# Patient Record
Sex: MALE | Race: WHITE | NOT HISPANIC OR LATINO | Employment: STUDENT | URBAN - METROPOLITAN AREA
[De-identification: names, ages, dates, MRNs, and addresses within clinical notes are randomized per-mention and may not be internally consistent; named-entity substitution may affect disease eponyms.]

---
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
## 2020-01-08 ENCOUNTER — HOSPITAL ENCOUNTER (OUTPATIENT)
Dept: LAB | Facility: MEDICAL CENTER | Age: 20
End: 2020-01-08
Attending: PEDIATRICS
Payer: COMMERCIAL

## 2020-01-08 LAB
INR PPP: 2.65 (ref 0.87–1.13)
PROTHROMBIN TIME: 29 SEC (ref 12–14.6)

## 2020-01-08 PROCEDURE — 85610 PROTHROMBIN TIME: CPT

## 2020-01-08 PROCEDURE — 36415 COLL VENOUS BLD VENIPUNCTURE: CPT

## 2020-01-27 ENCOUNTER — HOSPITAL ENCOUNTER (OUTPATIENT)
Dept: LAB | Facility: MEDICAL CENTER | Age: 20
End: 2020-01-27
Attending: PEDIATRICS
Payer: COMMERCIAL

## 2020-01-27 LAB
INR PPP: 3.29 (ref 0.87–1.13)
PROTHROMBIN TIME: 34.5 SEC (ref 12–14.6)

## 2020-01-27 PROCEDURE — 36415 COLL VENOUS BLD VENIPUNCTURE: CPT

## 2020-01-27 PROCEDURE — 85610 PROTHROMBIN TIME: CPT

## 2020-02-03 ENCOUNTER — HOSPITAL ENCOUNTER (OUTPATIENT)
Dept: LAB | Facility: MEDICAL CENTER | Age: 20
End: 2020-02-03
Attending: PEDIATRICS
Payer: COMMERCIAL

## 2020-02-03 LAB
INR PPP: 2.32 (ref 0.87–1.13)
PROTHROMBIN TIME: 26.1 SEC (ref 12–14.6)

## 2020-02-03 PROCEDURE — 85610 PROTHROMBIN TIME: CPT

## 2020-02-03 PROCEDURE — 36415 COLL VENOUS BLD VENIPUNCTURE: CPT

## 2020-02-05 ENCOUNTER — HOSPITAL ENCOUNTER (OUTPATIENT)
Dept: LAB | Facility: MEDICAL CENTER | Age: 20
End: 2020-02-05
Attending: PEDIATRICS
Payer: COMMERCIAL

## 2020-02-05 LAB
INR PPP: 2.7 (ref 0.87–1.13)
PROTHROMBIN TIME: 29.4 SEC (ref 12–14.6)

## 2020-02-05 PROCEDURE — 85610 PROTHROMBIN TIME: CPT

## 2020-02-05 PROCEDURE — 36415 COLL VENOUS BLD VENIPUNCTURE: CPT

## 2020-02-07 ENCOUNTER — HOSPITAL ENCOUNTER (OUTPATIENT)
Dept: LAB | Facility: MEDICAL CENTER | Age: 20
End: 2020-02-07
Attending: PEDIATRICS
Payer: COMMERCIAL

## 2020-02-07 LAB
INR PPP: 3.38 (ref 0.87–1.13)
PROTHROMBIN TIME: 35.2 SEC (ref 12–14.6)

## 2020-02-07 PROCEDURE — 36415 COLL VENOUS BLD VENIPUNCTURE: CPT

## 2020-02-07 PROCEDURE — 85610 PROTHROMBIN TIME: CPT

## 2020-02-08 ENCOUNTER — HOSPITAL ENCOUNTER (OUTPATIENT)
Dept: LAB | Facility: MEDICAL CENTER | Age: 20
End: 2020-02-08
Attending: PEDIATRICS
Payer: COMMERCIAL

## 2020-02-08 LAB
INR PPP: 3 (ref 0.87–1.13)
PROTHROMBIN TIME: 32.2 SEC (ref 12–14.6)

## 2020-02-08 PROCEDURE — 85610 PROTHROMBIN TIME: CPT

## 2020-02-08 PROCEDURE — 36415 COLL VENOUS BLD VENIPUNCTURE: CPT

## 2020-02-11 ENCOUNTER — HOSPITAL ENCOUNTER (OUTPATIENT)
Dept: LAB | Facility: MEDICAL CENTER | Age: 20
End: 2020-02-11
Attending: PEDIATRICS
Payer: COMMERCIAL

## 2020-02-11 LAB
INR PPP: 2.68 (ref 0.87–1.13)
PROTHROMBIN TIME: 29.3 SEC (ref 12–14.6)

## 2020-02-11 PROCEDURE — 36415 COLL VENOUS BLD VENIPUNCTURE: CPT

## 2020-02-11 PROCEDURE — 85610 PROTHROMBIN TIME: CPT

## 2020-02-14 ENCOUNTER — HOSPITAL ENCOUNTER (OUTPATIENT)
Dept: LAB | Facility: MEDICAL CENTER | Age: 20
End: 2020-02-14
Attending: PEDIATRICS
Payer: COMMERCIAL

## 2020-02-14 LAB
INR PPP: 3.33 (ref 0.87–1.13)
PROTHROMBIN TIME: 35.1 SEC (ref 12–14.6)

## 2020-02-14 PROCEDURE — 36415 COLL VENOUS BLD VENIPUNCTURE: CPT

## 2020-02-14 PROCEDURE — 85610 PROTHROMBIN TIME: CPT

## 2020-02-19 ENCOUNTER — HOSPITAL ENCOUNTER (OUTPATIENT)
Dept: LAB | Facility: MEDICAL CENTER | Age: 20
End: 2020-02-19
Attending: PEDIATRICS
Payer: COMMERCIAL

## 2020-02-19 LAB
INR PPP: 3.28 (ref 0.87–1.13)
PROTHROMBIN TIME: 34.4 SEC (ref 12–14.6)

## 2020-02-19 PROCEDURE — 36415 COLL VENOUS BLD VENIPUNCTURE: CPT

## 2020-02-19 PROCEDURE — 85610 PROTHROMBIN TIME: CPT

## 2020-03-05 ENCOUNTER — HOSPITAL ENCOUNTER (OUTPATIENT)
Dept: LAB | Facility: MEDICAL CENTER | Age: 20
End: 2020-03-05
Attending: PEDIATRICS
Payer: COMMERCIAL

## 2020-03-05 LAB
INR PPP: 3.78 (ref 0.87–1.13)
PROTHROMBIN TIME: 38.5 SEC (ref 12–14.6)

## 2020-03-05 PROCEDURE — 85610 PROTHROMBIN TIME: CPT

## 2020-03-05 PROCEDURE — 36415 COLL VENOUS BLD VENIPUNCTURE: CPT

## 2020-03-09 ENCOUNTER — HOSPITAL ENCOUNTER (OUTPATIENT)
Dept: LAB | Facility: MEDICAL CENTER | Age: 20
End: 2020-03-09
Attending: PEDIATRICS
Payer: COMMERCIAL

## 2020-03-09 LAB
INR PPP: 4.17 (ref 0.87–1.13)
PROTHROMBIN TIME: 41.7 SEC (ref 12–14.6)

## 2020-03-09 PROCEDURE — 85610 PROTHROMBIN TIME: CPT

## 2020-03-09 PROCEDURE — 36415 COLL VENOUS BLD VENIPUNCTURE: CPT

## 2020-03-12 ENCOUNTER — HOSPITAL ENCOUNTER (OUTPATIENT)
Dept: LAB | Facility: MEDICAL CENTER | Age: 20
End: 2020-03-12
Attending: PEDIATRICS
Payer: COMMERCIAL

## 2020-03-12 LAB
INR PPP: 2.81 (ref 0.87–1.13)
PROTHROMBIN TIME: 30.6 SEC (ref 12–14.6)

## 2020-03-12 PROCEDURE — 36415 COLL VENOUS BLD VENIPUNCTURE: CPT

## 2020-03-12 PROCEDURE — 85610 PROTHROMBIN TIME: CPT

## 2020-03-17 ENCOUNTER — HOSPITAL ENCOUNTER (OUTPATIENT)
Dept: LAB | Facility: MEDICAL CENTER | Age: 20
End: 2020-03-17
Attending: PEDIATRICS
Payer: COMMERCIAL

## 2020-03-17 LAB
INR PPP: 3.97 (ref 0.87–1.13)
PROTHROMBIN TIME: 40.1 SEC (ref 12–14.6)

## 2020-03-17 PROCEDURE — 36415 COLL VENOUS BLD VENIPUNCTURE: CPT

## 2020-03-17 PROCEDURE — 85610 PROTHROMBIN TIME: CPT

## 2020-03-23 ENCOUNTER — HOSPITAL ENCOUNTER (OUTPATIENT)
Dept: LAB | Facility: MEDICAL CENTER | Age: 20
End: 2020-03-23
Attending: PEDIATRICS
Payer: COMMERCIAL

## 2020-03-23 LAB
INR PPP: 2.78 (ref 0.87–1.13)
PROTHROMBIN TIME: 30.1 SEC (ref 12–14.6)

## 2020-03-23 PROCEDURE — 85610 PROTHROMBIN TIME: CPT

## 2020-03-23 PROCEDURE — 36415 COLL VENOUS BLD VENIPUNCTURE: CPT

## 2020-04-07 ENCOUNTER — HOSPITAL ENCOUNTER (OUTPATIENT)
Dept: LAB | Facility: MEDICAL CENTER | Age: 20
End: 2020-04-07
Attending: PEDIATRICS
Payer: COMMERCIAL

## 2020-04-07 LAB
INR PPP: 2.74 (ref 0.87–1.13)
PROTHROMBIN TIME: 29.8 SEC (ref 12–14.6)

## 2020-04-07 PROCEDURE — 85610 PROTHROMBIN TIME: CPT

## 2020-04-07 PROCEDURE — 36415 COLL VENOUS BLD VENIPUNCTURE: CPT

## 2020-04-27 ENCOUNTER — HOSPITAL ENCOUNTER (OUTPATIENT)
Dept: LAB | Facility: MEDICAL CENTER | Age: 20
End: 2020-04-27
Attending: PEDIATRICS
Payer: COMMERCIAL

## 2020-04-27 LAB
INR PPP: 2.05 (ref 0.87–1.13)
PROTHROMBIN TIME: 23.8 SEC (ref 12–14.6)

## 2020-04-27 PROCEDURE — 36415 COLL VENOUS BLD VENIPUNCTURE: CPT

## 2020-04-27 PROCEDURE — 85610 PROTHROMBIN TIME: CPT

## 2020-09-14 ENCOUNTER — TELEMEDICINE (OUTPATIENT)
Dept: MEDICAL GROUP | Facility: LAB | Age: 20
End: 2020-09-14
Payer: COMMERCIAL

## 2020-09-14 VITALS — WEIGHT: 103 LBS | BODY MASS INDEX: 20.22 KG/M2 | HEIGHT: 60 IN

## 2020-09-14 DIAGNOSIS — L73.2 HIDRADENITIS SUPPURATIVA: ICD-10-CM

## 2020-09-14 DIAGNOSIS — Q90.9 DOWN'S SYNDROME: ICD-10-CM

## 2020-09-14 DIAGNOSIS — Z95.2 HISTORY OF PROSTHETIC MITRAL VALVE: ICD-10-CM

## 2020-09-14 DIAGNOSIS — Z76.89 ENCOUNTER TO ESTABLISH CARE WITH NEW DOCTOR: ICD-10-CM

## 2020-09-14 DIAGNOSIS — I27.20 PULMONARY HYPERTENSION (HCC): ICD-10-CM

## 2020-09-14 PROBLEM — Q21.20 ATRIOVENTRICULAR SEPTAL DEFECT: Status: ACTIVE | Noted: 2020-09-14

## 2020-09-14 PROCEDURE — 99214 OFFICE O/P EST MOD 30 MIN: CPT | Performed by: FAMILY MEDICINE

## 2020-09-14 RX ORDER — WARFARIN SODIUM 5 MG/1
TABLET ORAL
COMMUNITY
Start: 2020-07-12

## 2020-09-14 RX ORDER — DOXYCYCLINE HYCLATE 100 MG
TABLET ORAL
COMMUNITY
Start: 2020-08-21

## 2020-09-14 RX ORDER — CLINDAMYCIN PHOSPHATE AND BENZOYL PEROXIDE 10; 50 MG/G; MG/G
GEL TOPICAL
COMMUNITY
Start: 2020-07-29

## 2020-09-14 RX ORDER — WARFARIN SODIUM 1 MG
TABLET ORAL
COMMUNITY
Start: 2020-07-08

## 2020-09-14 ASSESSMENT — FIBROSIS 4 INDEX: FIB4 SCORE: 0.52

## 2020-09-14 NOTE — PATIENT INSTRUCTIONS
Hidradenitis Suppurativa  Hidradenitis suppurativa is a long-term (chronic) skin disease. It is similar to a severe form of acne, but it affects areas of the body where acne would be unusual, especially areas of the body where skin rubs against skin and becomes moist. These include:  · Underarms.  · Groin.  · Genital area.  · Buttocks.  · Upper thighs.  · Breasts.  Hidradenitis suppurativa may start out as small lumps or pimples caused by blocked sweat glands or hair follicles. Pimples may develop into deep sores that break open (rupture) and drain pus. Over time, affected areas of skin may thicken and become scarred. This condition is rare and does not spread from person to person (non-contagious).  What are the causes?  The exact cause of this condition is not known. It may be related to:  · Male and female hormones.  · An overactive disease-fighting system (immune system). The immune system may over-react to blocked hair follicles or sweat glands and cause swelling and pus-filled sores.  What increases the risk?  You are more likely to develop this condition if you:  · Are female.  · Are 11-55 years old.  · Have a family history of hidradenitis suppurativa.  · Have a personal history of acne.  · Are overweight.  · Smoke.  · Take the medicine lithium.  What are the signs or symptoms?  The first symptoms are usually painful bumps in the skin, similar to pimples. The condition may get worse over time (progress), or it may only cause mild symptoms. If the disease progresses, symptoms may include:  · Skin bumps getting bigger and growing deeper into the skin.  · Bumps rupturing and draining pus.  · Itchy, infected skin.  · Skin getting thicker and scarred.  · Tunnels under the skin (fistulas) where pus drains from a bump.  · Pain during daily activities, such as pain during walking if your groin area is affected.  · Emotional problems, such as stress or depression. This condition may affect your appearance and your  ability or willingness to wear certain clothes or do certain activities.  How is this diagnosed?  This condition is diagnosed by a health care provider who specializes in skin diseases (dermatologist). You may be diagnosed based on:  · Your symptoms and medical history.  · A physical exam.  · Testing a pus sample for infection.  · Blood tests.  How is this treated?  Your treatment will depend on how severe your symptoms are. The same treatment will not work for everybody with this condition. You may need to try several treatments to find what works best for you. Treatment may include:  · Cleaning and bandaging (dressing) your wounds as needed.  · Lifestyle changes, such as new skin care routines.  · Taking medicines, such as:  ? Antibiotics.  ? Acne medicines.  ? Medicines to reduce the activity of the immune system.  ? A diabetes medicine (metformin).  ? Birth control pills, for women.  ? Steroids to reduce swelling and pain.  · Working with a mental health care provider, if you experience emotional distress due to this condition.  If you have severe symptoms that do not get better with medicine, you may need surgery. Surgery may involve:  · Using a laser to clear the skin and remove hair follicles.  · Opening and draining deep sores.  · Removing the areas of skin that are diseased and scarred.  Follow these instructions at home:  Medicines    · Take over-the-counter and prescription medicines only as told by your health care provider.  · If you were prescribed an antibiotic medicine, take it as told by your health care provider. Do not stop taking the antibiotic even if your condition improves.  Skin care  · If you have open wounds, cover them with a clean dressing as told by your health care provider. Keep wounds clean by washing them gently with soap and water when you bathe.  · Do not shave the areas where you get hidradenitis suppurativa.  · Do not wear deodorant.  · Wear loose-fitting clothes.  · Try to avoid  getting overheated or sweaty. If you get sweaty or wet, change into clean, dry clothes as soon as you can.  · To help relieve pain and itchiness, cover sore areas with a warm, clean washcloth (warm compress) for 5-10 minutes as often as needed.  · If told by your health care provider, take a bleach bath twice a week:  ? Fill your bathtub retirement with water.  ? Pour in ½ cup of unscented household bleach.  ? Soak in the tub for 5-10 minutes.  ? Only soak from the neck down. Avoid water on your face and hair.  ? Shower to rinse off the bleach from your skin.  General instructions  · Learn as much as you can about your disease so that you have an active role in your treatment. Work closely with your health care provider to find treatments that work for you.  · If you are overweight, work with your health care provider to lose weight as recommended.  · Do not use any products that contain nicotine or tobacco, such as cigarettes and e-cigarettes. If you need help quitting, ask your health care provider.  · If you struggle with living with this condition, talk with your health care provider or work with a mental health care provider as recommended.  · Keep all follow-up visits as told by your health care provider. This is important.  Where to find more information  · Hidradenitis Suppurativa Foundation, Inc.: https://www.hs-foundation.org/  Contact a health care provider if you have:  · A flare-up of hidradenitis suppurativa.  · A fever or chills.  · Trouble controlling your symptoms at home.  · Trouble doing your daily activities because of your symptoms.  · Trouble dealing with emotional problems related to your condition.  Summary  · Hidradenitis suppurativa is a long-term (chronic) skin disease. It is similar to a severe form of acne, but it affects areas of the body where acne would be unusual.  · The first symptoms are usually painful bumps in the skin, similar to pimples. The condition may get worse over time  (progress), or it may only cause mild symptoms.  · If you have open wounds, cover them with a clean dressing as told by your health care provider. Keep wounds clean by washing them gently with soap and water when you bathe.  · Besides skin care, treatment may include medicines, laser treatment, and surgery.  This information is not intended to replace advice given to you by your health care provider. Make sure you discuss any questions you have with your health care provider.  Document Released: 08/01/2005 Document Revised: 12/26/2018 Document Reviewed: 12/26/2018  Elsevier Patient Education © 2020 [employer]

## 2020-09-14 NOTE — PROGRESS NOTES
Virtual Visit: Established Patient   This visit was conducted via Zoom using secure and encrypted videoconferencing technology. The patient was in a private location in the state of [place].    The patient's identity was confirmed and verbal consent was obtained for this virtual visit.  There is guardian and was also at the visit.    Subjective:   CC:   Chief Complaint   Patient presents with   • Establish Care       Mir Madrigal is a 20 y.o. male presenting for evaluation and management of:    Patient is here to establish care.  He was a previous patient of Dr. Smith.  He has a history of Down syndrome and mitral valve stenosis.  He has had 2 surgeries to repair the mitral valve and has a prosthetic valve that was placed in 2014.  He is therefore on chronic anticoagulation.  This is monitored by the cardiologist.  He has been doing well overall without any complaints except he has had hidradenitis.  He seen the dermatologist and is on doxycycline as well as a topical cream.  They would like to consider Humira but do not want to do it during COVID.  This area is in his groin and his mother does warm soaks when it flares.  .  Past Medical History:   Diagnosis Date   • Anticoagulant long-term use 10/18/2016    Patient on Coumadin for St Alexey's mitral valve replacement   • Asthma    • Down's syndrome    '  Past Surgical History:   Procedure Laterality Date   • DENTAL RESTORATION  4/18/08    Performed by SUDHAKAR KOLB at SURGERY SAME DAY [facility]   • MITRAL VALVE REPLACE      2014 - but he has 2   • OTHER CARDIAC SURGERY      open heart x 3     Social History     Socioeconomic History   • Marital status: Single     Spouse name: Not on file   • Number of children: Not on file   • Years of education: Not on file   • Highest education level: Not on file   Occupational History   • Not on file   Social Needs   • Financial resource strain: Not on file   • Food insecurity     Worry: Not on file      Inability: Not on file   • Transportation needs     Medical: Not on file     Non-medical: Not on file   Tobacco Use   • Smoking status: Never Smoker   • Smokeless tobacco: Never Used   Substance and Sexual Activity   • Alcohol use: No   • Drug use: No   • Sexual activity: Not on file   Lifestyle   • Physical activity     Days per week: Not on file     Minutes per session: Not on file   • Stress: Not on file   Relationships   • Social connections     Talks on phone: Not on file     Gets together: Not on file     Attends Mandaeism service: Not on file     Active member of club or organization: Not on file     Attends meetings of clubs or organizations: Not on file     Relationship status: Not on file   • Intimate partner violence     Fear of current or ex partner: Not on file     Emotionally abused: Not on file     Physically abused: Not on file     Forced sexual activity: Not on file   Other Topics Concern   • Behavioral problems Not Asked   • Interpersonal relationships Not Asked   • Sad or not enjoying activities Not Asked   • Suicidal thoughts Not Asked   • Poor school performance Not Asked   • Reading difficulties Not Asked   • Speech difficulties Not Asked   • Writing difficulties Not Asked   • Inadequate sleep Not Asked   • Excessive TV viewing Not Asked   • Excessive video game use Not Asked   • Inadequate exercise Not Asked   • Sports related Not Asked   • Poor diet Not Asked   • Family concerns for drug/alcohol abuse Not Asked   • Poor oral hygiene Not Asked   • Bike safety Not Asked   • Family concerns vehicle safety Not Asked   Social History Narrative    ** Merged History Encounter **            ROS   Denies any recent fevers or chills. No nausea or vomiting. No chest pains or shortness of breath.     No Known Allergies    Current medicines (including changes today)  Current Outpatient Medications   Medication Sig Dispense Refill   • Clindamycin-Benzoyl Per, Refr, 1.2-5 % Gel      • doxycycline  "(VIBRAMYCIN) 100 MG Tab      • MUPIROCIN EX by Apply externally route. 1 %     • warfarin (COUMADIN) 5 MG Tab Take 5mg EOD alternating with 5.5mg EOD     • Pediatric Multivit-Minerals-C (CHILDRENS GUMMIES) Chew Tab Take 2 Tabs by mouth every day.     • Calcium-Phosphorus-Vitamin D (CALCIUM/D3 ADULT GUMMIES PO) Take 2 Tabs by mouth every day.     • COUMADIN 1 MG Tab      • COUMADIN 5 MG Tab        No current facility-administered medications for this visit.        Patient Active Problem List    Diagnosis Date Noted   • Down's syndrome 10/18/2016     Priority: Medium   • Atrioventricular septal defect 09/14/2020   • History of prosthetic mitral valve 09/14/2020   • Hidradenitis suppurativa 09/14/2020   • History of asthma 04/23/2019   • Asymptomatic varicose veins of right lower extremity 04/23/2019   • Ventral hernia without obstruction or gangrene 04/23/2019   • Pulmonary hypertension (HCC) 09/14/2014       Family History   Problem Relation Age of Onset   • Heart Attack Maternal Uncle    • Heart Disease Maternal Grandfather    • Cancer Paternal Grandmother    • Hyperlipidemia Paternal Grandfather        He  has a past medical history of Anticoagulant long-term use (10/18/2016), Asthma, and Down's syndrome.  He  has a past surgical history that includes dental restoration (4/18/08); other cardiac surgery; and mitral valve replace.       Objective:   Ht 1.473 m (4' 10\")   Wt 46.7 kg (103 lb)   BMI 21.53 kg/m²     Physical Exam:  Constitutional: Alert, no distress, well-groomed.  Skin: No rashes in visible areas.  Eye: Round. Conjunctiva clear, lids normal. No icterus.   ENMT: Lips pink without lesions, good dentition, moist mucous membranes. Phonation normal.  Neck: No masses, no thyromegaly. Moves freely without pain.  Respiratory: Unlabored respiratory effort, no cough or audible wheeze  Psych: Alert and oriented x3, normal affect and mood.       Assessment and Plan:   The following treatment plan was discussed: "     1. Encounter to establish care with new doctor  History reviewed.    2. Down's syndrome  This is a new problem to me.  Currently stable    3. History of prosthetic mitral valve  This is a new problem to me.  Currently stable    4. Pulmonary hypertension (HCC)  This is a new problem to me.  Currently stable.  Seeing cardiology tomorrow    5. Hidradenitis suppurativa  Education materials given.  Continue doxycycline      Follow-up: Return in about 1 year (around 9/14/2021).

## 2020-10-14 ENCOUNTER — IMMUNIZATION (OUTPATIENT)
Dept: SOCIAL WORK | Facility: CLINIC | Age: 20
End: 2020-10-14
Payer: COMMERCIAL

## 2020-10-14 DIAGNOSIS — Z23 NEED FOR VACCINATION: ICD-10-CM

## 2020-10-14 PROCEDURE — 90471 IMMUNIZATION ADMIN: CPT | Performed by: REGISTERED NURSE

## 2020-10-14 PROCEDURE — 90686 IIV4 VACC NO PRSV 0.5 ML IM: CPT | Performed by: REGISTERED NURSE
